# Patient Record
Sex: MALE | Race: WHITE | Employment: OTHER | URBAN - METROPOLITAN AREA
[De-identification: names, ages, dates, MRNs, and addresses within clinical notes are randomized per-mention and may not be internally consistent; named-entity substitution may affect disease eponyms.]

---

## 2020-09-11 ENCOUNTER — HOSPITAL ENCOUNTER (OUTPATIENT)
Dept: VASCULAR LAB | Age: 75
Discharge: HOME | End: 2020-09-11
Payer: COMMERCIAL

## 2020-09-11 ENCOUNTER — TELEPHONE (OUTPATIENT)
Dept: CARDIOLOGY CLINIC | Age: 75
End: 2020-09-11

## 2020-09-11 ENCOUNTER — OFFICE VISIT (OUTPATIENT)
Dept: CARDIOLOGY CLINIC | Age: 75
End: 2020-09-11
Payer: COMMERCIAL

## 2020-09-11 VITALS
BODY MASS INDEX: 29.85 KG/M2 | WEIGHT: 220.4 LBS | TEMPERATURE: 98.2 F | SYSTOLIC BLOOD PRESSURE: 126 MMHG | HEIGHT: 72 IN | HEART RATE: 105 BPM | OXYGEN SATURATION: 92 % | DIASTOLIC BLOOD PRESSURE: 72 MMHG

## 2020-09-11 PROCEDURE — 93925 LOWER EXTREMITY STUDY: CPT

## 2020-09-11 PROCEDURE — 99204 OFFICE O/P NEW MOD 45 MIN: CPT | Performed by: INTERNAL MEDICINE

## 2020-09-11 PROCEDURE — 93000 ELECTROCARDIOGRAM COMPLETE: CPT | Performed by: INTERNAL MEDICINE

## 2020-09-11 RX ORDER — VALSARTAN AND HYDROCHLOROTHIAZIDE 160; 12.5 MG/1; MG/1
1 TABLET, FILM COATED ORAL DAILY
COMMUNITY

## 2020-09-11 RX ORDER — OMEPRAZOLE 40 MG/1
40 CAPSULE, DELAYED RELEASE ORAL DAILY
COMMUNITY

## 2020-09-11 RX ORDER — ASPIRIN 81 MG/1
81 TABLET ORAL DAILY
COMMUNITY

## 2020-09-11 RX ORDER — GLIMEPIRIDE 2 MG/1
2 TABLET ORAL
COMMUNITY

## 2020-09-11 RX ORDER — THEOPHYLLINE 400 MG/1
400 TABLET, EXTENDED RELEASE ORAL DAILY
COMMUNITY

## 2020-09-11 RX ORDER — FUROSEMIDE 40 MG/1
40 TABLET ORAL 2 TIMES DAILY
COMMUNITY

## 2020-09-11 RX ORDER — ALLOPURINOL 100 MG/1
100 TABLET ORAL DAILY
COMMUNITY

## 2020-09-11 RX ORDER — PREDNISONE 10 MG/1
10 TABLET ORAL DAILY
COMMUNITY

## 2020-09-11 RX ORDER — LEVOTHYROXINE SODIUM 0.1 MG/1
100 TABLET ORAL DAILY
COMMUNITY

## 2020-09-11 RX ORDER — TIOTROPIUM BROMIDE AND OLODATEROL 3.124; 2.736 UG/1; UG/1
SPRAY, METERED RESPIRATORY (INHALATION)
COMMUNITY

## 2020-09-11 RX ORDER — ALBUTEROL SULFATE 2.5 MG/3ML
2.5 SOLUTION RESPIRATORY (INHALATION) EVERY 6 HOURS PRN
COMMUNITY

## 2020-09-11 RX ORDER — ATORVASTATIN CALCIUM 40 MG/1
40 TABLET, FILM COATED ORAL DAILY
COMMUNITY

## 2020-09-11 RX ORDER — PIOGLITAZONEHYDROCHLORIDE 30 MG/1
30 TABLET ORAL DAILY
COMMUNITY

## 2020-09-11 NOTE — PROGRESS NOTES
[address]  Cardiology Consult    Maryjane Gibson  1945    September 11, 2020      Reason for Referral: PAD    CC: \"I don't know what is going on. \"      Subjective:     History of Present Illness:    Maryjane Gibson is a 69 y.o. patient with a PMH significant for hypertension, diabetes and thoracic aortic aneurysm, COPD. Today, he is here as a new patient for peripheral artery disease. He reports that he reports that he did develop blisters on his feet. He is unsure why he had the blisters because he has not been wearing shoes for about 6 months. He is wearing continuous oxygen due to his COPD. Patient denies exertional chest pain/pressure, dyspnea at rest, GIBSON, PND, orthopnea, palpitations, lightheadedness, weight changes, changes in LE edema, and syncope. Past Medical History:   has a past medical history of COPD (chronic obstructive pulmonary disease) ([facility] [address] Diabetes mellitus ([facility] [address] Hyperlipidemia, and Hypertension. Surgical History:   has no past surgical history on file. Social History:   reports that he has quit smoking. He has never used smokeless tobacco. He reports previous alcohol use. Family History:  family history is not on file. Home Medications:  Were reviewed and are listed in nursing record and/or below  Prior to Admission medications    Medication Sig Start Date End Date Taking?  Authorizing Provider   albuterol sulfate (PROAIR RESPICLICK) 002 (90 Base) MCG/ACT aerosol powder inhalation Inhale into the lungs every 4 hours as needed for Wheezing or Shortness of Breath   Yes Historical Provider, MD   albuterol (PROVENTIL) (2.5 MG/3ML) 0.083% nebulizer solution Take 2.5 mg by nebulization every 6 hours as needed for Wheezing   Yes Historical Provider, MD   Tiotropium Bromide-Olodaterol (STIOLTO RESPIMAT) 2.5-2.5 MCG/ACT AERS Inhale into the lungs   Yes Historical Provider, MD   valsartan-hydrochlorothiazide (DIOVAN-HCT) 160-12.5 MG per tablet Take 1 tablet by mouth daily   Yes Historical Provider, MD   theophylline (UNIPHYL) 400 MG extended release tablet Take 400 mg by mouth daily   Yes Historical Provider, MD   predniSONE (DELTASONE) 10 MG tablet Take 10 mg by mouth daily   Yes Historical Provider, MD   dextrose 5 % SOLN 1,000 mL with potassium chloride 2 MEQ/ML SOLN 30 mEq Infuse intravenously continuous   Yes Historical Provider, MD   pioglitazone (ACTOS) 30 MG tablet Take 30 mg by mouth daily   Yes Historical Provider, MD   omeprazole (PRILOSEC) 40 MG delayed release capsule Take 40 mg by mouth daily   Yes Historical Provider, MD   Lidocaine 4 % LOTN Apply topically   Yes Historical Provider, MD   levothyroxine (SYNTHROID) 100 MCG tablet Take 100 mcg by mouth Daily   Yes Historical Provider, MD   Pseudoephedrine-guaiFENesin (GUAIFENESIN 600/ PO) Take by mouth   Yes Historical Provider, MD   glimepiride (AMARYL) 2 MG tablet Take 2 mg by mouth every morning (before breakfast)   Yes Historical Provider, MD   furosemide (LASIX) 40 MG tablet Take 40 mg by mouth 2 times daily   Yes Historical Provider, MD   Roflumilast (DALIRESP) 500 MCG tablet Take 500 mcg by mouth daily   Yes Historical Provider, MD   atorvastatin (LIPITOR) 40 MG tablet Take 40 mg by mouth daily   Yes Historical Provider, MD   aspirin 81 MG EC tablet Take 81 mg by mouth daily   Yes Historical Provider, MD   allopurinol (ZYLOPRIM) 100 MG tablet Take 100 mg by mouth daily   Yes Historical Provider, MD        CURRENT Medications:  No current facility-administered medications for this visit. Allergies:  Patient has no known allergies. Review of Systems: SEE HPI   · Constitutional: no unanticipated weight loss. There's been no change in energy level, sleep pattern, or activity level. No fevers, chills. · Eyes: No visual changes or diplopia. No scleral icterus. · ENT: No Headaches, hearing loss or vertigo. No mouth sores or sore throat.   · Cardiovascular: No Chest pain, tightness or discomfort.  No Shortness of breath. No Dyspnea on exertion, Orthopnea, Paroxysmal nocturnal dyspnea or breathlessness at rest.   No Palpitations.  No Syncope ('blackouts', 'faints', 'collapse') or dizziness. · Respiratory: No cough or wheezing, no sputum production. No hematemesis. · Gastrointestinal: No abdominal pain, appetite loss, blood in stools. No change in bowel or bladder habits. · Genitourinary: No dysuria, trouble voiding, or hematuria. · Musculoskeletal:  No gait disturbance, no joint complaints. · Integumentary: No rash or pruritis. · Neurological: No headache, diplopia, change in muscle strength, numbness or tingling. · Psychiatric: No anxiety or depression. · Endocrine: No temperature intolerance. No excessive thirst, fluid intake, or urination. No tremor. · Hematologic/Lymphatic: No abnormal bruising or bleeding, blood clots or swollen lymph nodes. · Allergic/Immunologic: No nasal congestion or hives. Objective:     PHYSICAL EXAM:      Vitals:    09/11/20 1340   BP: 126/72   Pulse: 105   Temp: 98.2 °F (36.8 °C)   SpO2: 92%    Weight: 220 lb 6.4 oz (100 kg)       General Appearance:  Alert, cooperative, no distress, appears stated age. Head:  Normocephalic, without obvious abnormality, atraumatic. Eyes:  Pupils equal and round. No scleral icterus. Mouth: Moist mucosa, no pharyngeal erythema. Nose: Nares normal. No drainage or sinus tenderness. Neck: Supple, symmetrical, trachea midline. No adenopathy. No tenderness/mass/nodules. No carotid bruit or elevated JVD. Lungs:   Respiratory Effort: Normal   Auscultation: Clear to auscultation bilaterally, respirations unlabored. No wheeze, rales   Chest Wall:  No tenderness or deformity. Cardiovascular:    Pulses  Palpation: normal   Ascultation: Regular rate, S1/ S2 normal. No murmur, rub, or gallop.   2+ radial and pedal pulses, symmetric  Carotid  Femoral   Abdomen and Gastrointestinal:   Soft, non-tender, bowel sounds active. Liver and Spleen  Masses   Musculoskeletal: No muscle wasting  Back  Gait   Extremities: Extremities normal, atraumatic. No cyanosis or edema. No cyanosis clubbing       Skin: Inspection and palpation performed, no rashes or lesions. Pysch: Normal mood and affect. Alert and oriented to time place person   Neurologic: Normal gross motor and sensory exam.       Labs     All labs have been reviewed    No results found for: WBC, RBC, HGB, HCT, MCV, RDW, PLT  No results found for: NA, K, CL, CO2, BUN, CREATININE, GFRAA, AGRATIO, LABGLOM, GLUCOSE, PROT, CALCIUM, BILITOT, ALKPHOS, AST, ALT  No results found for: PTINR  No results found for: LABA1C  No results found for: CKTOTAL, CKMB, CKMBINDEX, TROPONINI    Cardiac, Vascular and Imaging Data all Personally Reviewed in Detail by Myself      EK2020 Sinus tachycardia    Echocardiogram: 2020  Overall left ventricular ejection fraction is estimated to be 50-55%. Septal motion is consistent with conduction abnormality. Stress Test:     Cath: Other imaging:     Assessment and Plan     Peripheral artery disease/Critical limb ischemia  Discoloration of feet. L> R. Will get a bilateral lower extremity arterial doppler to assess further. Continue Asa and statin. Strongly suspect small vessel disease and tibial disease  Will need diagnostic angiogram.    Thoracic aortic aneurysm  Following with Dr Lauren Sow. Diabetes  Managed by PCP. Follow up after testing. Thank you for allowing us to participate in the care of Luci Primer. Please do not hesitate to contact me if you have any questions.     Tracie Oh MD, MPH    [facility], [address] [place], [place] [address]ard [address] [address]  Ph: [phone]  Fax: [phone]      This note was scribed in the presence of Dr Vera Mckeon, by Irma Dennis RN  Physician Attestation:  The scribes documentation has been prepared under my direction and personally reviewed by me in its entirety. I confirm that the note above accurately reflects all work, treatment, procedures, and medical decision making performed by me.

## 2020-09-11 NOTE — TELEPHONE ENCOUNTER
Peripheral angiogram scheduled for 9/16 at 1:00  Arrive at 11:30        The morning of your procedure you will park in the hospital parking lot and report directly to the cath lab to check in.     Pre-Procedure Instructions   1. You will need to fast for at least 8 hours prior to procedure. No caffeine the morning of.    2. Hold your diuretic, Valsartan/HCTZ and Lasix the morning of procedure. 3. Hold all diabetic medications including, Metfomin. If you take Lantus/Levemir only take ½ your normal dose the evening before. All other medications can be taken in the morning with sips of water. 4. You will need to take 325 mg aspirin the morning of. If you are currently taking 81 mg please take 4 tablets that morning. 5. Do not use any lotions, creams or perfume the morning of procedure. 6. Pre-procedure lab work will need to be completed 5-7 days prior to procedure. 7. Please have a responsible adult to drive you home after procedure. We advise you have someone to stay with you for 24 hours following procedure for precautionary measures. Depending on procedure you may require an overnight stay. 8. Cath lab will provide you with all post procedure instructions. If you have any questions regarding the procedure itself or medications, please call [phone] and ask to speak with a nurse. Called patient and let him know of the date/time. He asked that I email him the instructions. He called and confirmed that he received the email. All questions answered. He is unable to make it back to the lab and will need labs drawn on the morning of procedure. Will email Azra Davison in cath lab.

## 2020-09-11 NOTE — PATIENT INSTRUCTIONS
thinners can cause serious bleeding problems. · Ask your doctor if a cardiac rehab program is right for you. Cardiac rehab can help you make lifestyle changes. In cardiac rehab, a team of health professionals provides education and support to help you make new, healthy habits. · Eat heart-healthy foods such as fruits, vegetables, whole grains, fish, lean meats, and low-fat or nonfat dairy foods. Limit sodium, sugar, and alcohol. · If your doctor recommends it, get more exercise. Walking is a good choice. Bit by bit, increase the amount you walk every day. Try for at least 30 minutes on most days of the week. If you have symptoms when you exercise, ask your doctor about a special exercise program that may help relieve your symptoms. · Stay at a healthy weight. Lose weight if you need to. · Take good care of your feet. ? Treat cuts and scrapes on your legs right away. Poor blood flow prevents (or slows) quick healing of even small cuts or scrapes. This is even more important if you have diabetes. ? Avoid shoes that are too tight or that rub your feet. Shoes should be comfortable and fit well. ? Avoid socks or stockings that are tight enough to leave elastic-band marks on your legs. Tight socks can make circulation problems worse. ? Keep your feet clean and moisturized to prevent drying and cracking. Place cotton or lamb's wool between your toes to prevent rubbing and to absorb moisture. ? If you have a sore on your leg or foot, keep it dry and cover it with a nonstick bandage until you see your doctor. When should you call for help? [id number] anytime you think you may need emergency care. For example, call if:  · You have symptoms of a heart attack. These may include:  ? Chest pain or pressure, or a strange feeling in the chest.  ? Sweating. ? Shortness of breath. ? Nausea or vomiting.   ? Pain, pressure, or a strange feeling in the back, neck, jaw, or upper belly or in one or both shoulders or questions about a medical condition or this instruction, always ask your healthcare professional. [place] [address] any warranty or liability for your use of this information.

## 2020-09-15 RX ORDER — SODIUM CHLORIDE 0.9 % (FLUSH) 0.9 %
10 SYRINGE (ML) INJECTION EVERY 12 HOURS SCHEDULED
Status: CANCELLED | OUTPATIENT
Start: 2020-09-16

## 2020-09-15 RX ORDER — SODIUM CHLORIDE 9 MG/ML
INJECTION, SOLUTION INTRAVENOUS CONTINUOUS
Status: CANCELLED | OUTPATIENT
Start: 2020-09-16

## 2020-09-15 RX ORDER — SODIUM CHLORIDE 0.9 % (FLUSH) 0.9 %
10 SYRINGE (ML) INJECTION PRN
Status: CANCELLED | OUTPATIENT
Start: 2020-09-16

## 2020-09-16 ENCOUNTER — HOSPITAL ENCOUNTER (OUTPATIENT)
Dept: CARDIAC CATH/INVASIVE PROCEDURES | Age: 75
Discharge: HOME | End: 2020-09-16
Attending: INTERNAL MEDICINE | Admitting: INTERNAL MEDICINE
Payer: COMMERCIAL

## 2020-09-16 VITALS
TEMPERATURE: 97.6 F | HEIGHT: 72 IN | SYSTOLIC BLOOD PRESSURE: 114 MMHG | BODY MASS INDEX: 29.8 KG/M2 | OXYGEN SATURATION: 94 % | WEIGHT: 220 LBS | HEART RATE: 74 BPM | DIASTOLIC BLOOD PRESSURE: 78 MMHG

## 2020-09-16 PROBLEM — I73.9 PAD (PERIPHERAL ARTERY DISEASE) (HCC): Status: ACTIVE | Noted: 2020-09-16

## 2020-09-16 LAB
ANION GAP SERPL CALCULATED.3IONS-SCNC: 10 MMOL/L (ref 3–16)
BUN BLDV-MCNC: 34 MG/DL (ref 7–20)
CALCIUM IONIZED: 1.31 MMOL/L (ref 1.12–1.32)
CALCIUM SERPL-MCNC: 10 MG/DL (ref 8.3–10.6)
CHLORIDE BLD-SCNC: 100 MMOL/L (ref 99–110)
CO2: 32 MMOL/L (ref 21–32)
CREAT SERPL-MCNC: 1.5 MG/DL (ref 0.8–1.3)
GFR AFRICAN AMERICAN: 56
GFR AFRICAN AMERICAN: >60
GFR NON-AFRICAN AMERICAN: 46
GFR NON-AFRICAN AMERICAN: 50
GLUCOSE BLD-MCNC: 132 MG/DL (ref 70–99)
GLUCOSE BLD-MCNC: 136 MG/DL (ref 70–99)
HCT VFR BLD CALC: 41.1 % (ref 40.5–52.5)
HEMOGLOBIN: 12.8 G/DL (ref 13.5–17.5)
LV EF: 43 %
LVEF MODALITY: NORMAL
MCH RBC QN AUTO: 24.9 PG (ref 26–34)
MCHC RBC AUTO-ENTMCNC: 31.1 G/DL (ref 31–36)
MCV RBC AUTO: 79.9 FL (ref 80–100)
PDW BLD-RTO: 16.5 % (ref 12.4–15.4)
PERFORMED ON: ABNORMAL
PLATELET # BLD: 543 K/UL (ref 135–450)
PMV BLD AUTO: 8.6 FL (ref 5–10.5)
POC ACT LR: 193 SEC
POC CHLORIDE: 102 MMOL/L (ref 99–110)
POC CREATININE: 1.4 MG/DL (ref 0.8–1.3)
POC POTASSIUM: 4 MMOL/L (ref 3.5–5.1)
POC SAMPLE TYPE: ABNORMAL
POC SODIUM: 143 MMOL/L (ref 136–145)
POTASSIUM SERPL-SCNC: 4.1 MMOL/L (ref 3.5–5.1)
RBC # BLD: 5.14 M/UL (ref 4.2–5.9)
SODIUM BLD-SCNC: 142 MMOL/L (ref 136–145)
WBC # BLD: 12.2 K/UL (ref 4–11)

## 2020-09-16 PROCEDURE — 6360000004 HC RX CONTRAST MEDICATION: Performed by: INTERNAL MEDICINE

## 2020-09-16 PROCEDURE — 82565 ASSAY OF CREATININE: CPT

## 2020-09-16 PROCEDURE — 82435 ASSAY OF BLOOD CHLORIDE: CPT

## 2020-09-16 PROCEDURE — C1894 INTRO/SHEATH, NON-LASER: HCPCS

## 2020-09-16 PROCEDURE — 80048 BASIC METABOLIC PNL TOTAL CA: CPT

## 2020-09-16 PROCEDURE — 36247 INS CATH ABD/L-EXT ART 3RD: CPT

## 2020-09-16 PROCEDURE — 84132 ASSAY OF SERUM POTASSIUM: CPT

## 2020-09-16 PROCEDURE — 75716 ARTERY X-RAYS ARMS/LEGS: CPT | Performed by: INTERNAL MEDICINE

## 2020-09-16 PROCEDURE — 82947 ASSAY GLUCOSE BLOOD QUANT: CPT

## 2020-09-16 PROCEDURE — 85347 COAGULATION TIME ACTIVATED: CPT

## 2020-09-16 PROCEDURE — 99152 MOD SED SAME PHYS/QHP 5/>YRS: CPT

## 2020-09-16 PROCEDURE — 75625 CONTRAST EXAM ABDOMINL AORTA: CPT

## 2020-09-16 PROCEDURE — 2709999900 HC NON-CHARGEABLE SUPPLY

## 2020-09-16 PROCEDURE — 6360000002 HC RX W HCPCS

## 2020-09-16 PROCEDURE — 75716 ARTERY X-RAYS ARMS/LEGS: CPT

## 2020-09-16 PROCEDURE — 84295 ASSAY OF SERUM SODIUM: CPT

## 2020-09-16 PROCEDURE — 99153 MOD SED SAME PHYS/QHP EA: CPT

## 2020-09-16 PROCEDURE — 85027 COMPLETE CBC AUTOMATED: CPT

## 2020-09-16 PROCEDURE — 36247 INS CATH ABD/L-EXT ART 3RD: CPT | Performed by: INTERNAL MEDICINE

## 2020-09-16 PROCEDURE — 2500000003 HC RX 250 WO HCPCS

## 2020-09-16 PROCEDURE — 75625 CONTRAST EXAM ABDOMINL AORTA: CPT | Performed by: INTERNAL MEDICINE

## 2020-09-16 PROCEDURE — C1769 GUIDE WIRE: HCPCS

## 2020-09-16 PROCEDURE — 82330 ASSAY OF CALCIUM: CPT

## 2020-09-16 PROCEDURE — 75774 ARTERY X-RAY EACH VESSEL: CPT

## 2020-09-16 PROCEDURE — 93306 TTE W/DOPPLER COMPLETE: CPT

## 2020-09-16 RX ORDER — ACETAMINOPHEN 325 MG/1
650 TABLET ORAL EVERY 4 HOURS PRN
Status: DISCONTINUED | OUTPATIENT
Start: 2020-09-16 | End: 2020-09-17 | Stop reason: HOSPADM

## 2020-09-16 RX ORDER — SODIUM CHLORIDE 0.9 % (FLUSH) 0.9 %
10 SYRINGE (ML) INJECTION PRN
Status: DISCONTINUED | OUTPATIENT
Start: 2020-09-16 | End: 2020-09-16 | Stop reason: SDUPTHER

## 2020-09-16 RX ORDER — ASPIRIN 325 MG
325 TABLET ORAL ONCE
Status: DISCONTINUED | OUTPATIENT
Start: 2020-09-16 | End: 2020-09-17 | Stop reason: HOSPADM

## 2020-09-16 RX ORDER — SODIUM CHLORIDE 0.9 % (FLUSH) 0.9 %
10 SYRINGE (ML) INJECTION EVERY 12 HOURS SCHEDULED
Status: DISCONTINUED | OUTPATIENT
Start: 2020-09-16 | End: 2020-09-17 | Stop reason: HOSPADM

## 2020-09-16 RX ORDER — IODIXANOL 320 MG/ML
110 INJECTION, SOLUTION INTRAVASCULAR
Status: COMPLETED | OUTPATIENT
Start: 2020-09-16 | End: 2020-09-16

## 2020-09-16 RX ORDER — SODIUM CHLORIDE 0.9 % (FLUSH) 0.9 %
10 SYRINGE (ML) INJECTION PRN
Status: DISCONTINUED | OUTPATIENT
Start: 2020-09-16 | End: 2020-09-17 | Stop reason: HOSPADM

## 2020-09-16 RX ORDER — ONDANSETRON 2 MG/ML
4 INJECTION INTRAMUSCULAR; INTRAVENOUS EVERY 6 HOURS PRN
Status: DISCONTINUED | OUTPATIENT
Start: 2020-09-16 | End: 2020-09-17 | Stop reason: HOSPADM

## 2020-09-16 RX ORDER — SODIUM CHLORIDE 0.9 % (FLUSH) 0.9 %
10 SYRINGE (ML) INJECTION EVERY 12 HOURS SCHEDULED
Status: DISCONTINUED | OUTPATIENT
Start: 2020-09-16 | End: 2020-09-16 | Stop reason: SDUPTHER

## 2020-09-16 RX ORDER — SODIUM CHLORIDE 9 MG/ML
INJECTION, SOLUTION INTRAVENOUS CONTINUOUS
Status: DISCONTINUED | OUTPATIENT
Start: 2020-09-16 | End: 2020-09-17 | Stop reason: HOSPADM

## 2020-09-16 RX ADMIN — IODIXANOL 110 ML: 320 INJECTION, SOLUTION INTRAVASCULAR at 13:52

## 2020-09-16 NOTE — H&P
Reason for Referral: PAD     CC: \"I don't know what is going on. \"        Subjective:      History of Present Illness:     Ariela Doe is a 69 y.o. patient with a PMH significant for hypertension, diabetes and thoracic aortic aneurysm, COPD. Today, he is here as a new patient for peripheral artery disease. He reports that he reports that he did develop blisters on his feet. He is unsure why he had the blisters because he has not been wearing shoes for about 6 months. He is wearing continuous oxygen due to his COPD. Patient denies exertional chest pain/pressure, dyspnea at rest, KILLIAN, PND, orthopnea, palpitations, lightheadedness, weight changes, changes in LE edema, and syncope.        Past Medical History:   has a past medical history of COPD (chronic obstructive pulmonary disease) ([facility] [address] Diabetes mellitus ([facility] [address] Hyperlipidemia, and Hypertension.     Surgical History:   has no past surgical history on file.      Social History:   reports that he has quit smoking. He has never used smokeless tobacco. He reports previous alcohol use.      Family History:  family history is not on file.     Home Medications:  Were reviewed and are listed in nursing record and/or below  Home Medications           Prior to Admission medications    Medication Sig Start Date End Date Taking?  Authorizing Provider   albuterol sulfate (PROAIR RESPICLICK) 518 (90 Base) MCG/ACT aerosol powder inhalation Inhale into the lungs every 4 hours as needed for Wheezing or Shortness of Breath     Yes Historical Provider, MD   albuterol (PROVENTIL) (2.5 MG/3ML) 0.083% nebulizer solution Take 2.5 mg by nebulization every 6 hours as needed for Wheezing     Yes Historical Provider, MD   Tiotropium Bromide-Olodaterol (STIOLTO RESPIMAT) 2.5-2.5 MCG/ACT AERS Inhale into the lungs     Yes Historical Provider, MD   valsartan-hydrochlorothiazide (DIOVAN-HCT) 160-12.5 MG per tablet Take 1 tablet by mouth daily     Yes Historical Provider, MD theophylline (UNIPHYL) 400 MG extended release tablet Take 400 mg by mouth daily     Yes Historical Provider, MD   predniSONE (DELTASONE) 10 MG tablet Take 10 mg by mouth daily     Yes Historical Provider, MD   dextrose 5 % SOLN 1,000 mL with potassium chloride 2 MEQ/ML SOLN 30 mEq Infuse intravenously continuous     Yes Historical Provider, MD   pioglitazone (ACTOS) 30 MG tablet Take 30 mg by mouth daily     Yes Historical Provider, MD   omeprazole (PRILOSEC) 40 MG delayed release capsule Take 40 mg by mouth daily     Yes Historical Provider, MD   Lidocaine 4 % LOTN Apply topically     Yes Historical Provider, MD   levothyroxine (SYNTHROID) 100 MCG tablet Take 100 mcg by mouth Daily     Yes Historical Provider, MD   Pseudoephedrine-guaiFENesin (GUAIFENESIN 600/ PO) Take by mouth     Yes Historical Provider, MD   glimepiride (AMARYL) 2 MG tablet Take 2 mg by mouth every morning (before breakfast)     Yes Historical Provider, MD   furosemide (LASIX) 40 MG tablet Take 40 mg by mouth 2 times daily     Yes Historical Provider, MD   Roflumilast (DALIRESP) 500 MCG tablet Take 500 mcg by mouth daily     Yes Historical Provider, MD   atorvastatin (LIPITOR) 40 MG tablet Take 40 mg by mouth daily     Yes Historical Provider, MD   aspirin 81 MG EC tablet Take 81 mg by mouth daily     Yes Historical Provider, MD   allopurinol (ZYLOPRIM) 100 MG tablet Take 100 mg by mouth daily     Yes Historical Provider, MD            CURRENT Medications:    Current Meds Link used for Sign Out Report   No current facility-administered medications for this visit.         Allergies:  Patient has no known allergies.             Review of Systems: SEE HPI   · Constitutional: no unanticipated weight loss. There's been no change in energy level, sleep pattern, or activity level. No fevers, chills. · Eyes: No visual changes or diplopia. No scleral icterus. · ENT: No Headaches, hearing loss or vertigo.  No mouth sores or sore throat. · Cardiovascular: No Chest pain, tightness or discomfort. · No Shortness of breath. No Dyspnea on exertion, Orthopnea, Paroxysmal nocturnal dyspnea or breathlessness at rest.  · No Palpitations. · No Syncope ('blackouts', 'faints', 'collapse') or dizziness. · Respiratory: No cough or wheezing, no sputum production. No hematemesis. · Gastrointestinal: No abdominal pain, appetite loss, blood in stools. No change in bowel or bladder habits. · Genitourinary: No dysuria, trouble voiding, or hematuria. · Musculoskeletal:  No gait disturbance, no joint complaints. · Integumentary: No rash or pruritis. · Neurological: No headache, diplopia, change in muscle strength, numbness or tingling. · Psychiatric: No anxiety or depression. · Endocrine: No temperature intolerance. No excessive thirst, fluid intake, or urination. No tremor. · Hematologic/Lymphatic: No abnormal bruising or bleeding, blood clots or swollen lymph nodes. · Allergic/Immunologic: No nasal congestion or hives.        Objective:      PHYSICAL EXAM:           Vitals:     09/11/20 1340   BP: 126/72   Pulse: 105   Temp: 98.2 °F (36.8 °C)   SpO2: 92%    Weight: 220 lb 6.4 oz (100 kg)       General Appearance:  Alert, cooperative, no distress, appears stated age. Head:  Normocephalic, without obvious abnormality, atraumatic. Eyes:  Pupils equal and round. No scleral icterus. Mouth: Moist mucosa, no pharyngeal erythema. Nose: Nares normal. No drainage or sinus tenderness. Neck: Supple, symmetrical, trachea midline. No adenopathy. No tenderness/mass/nodules. No carotid bruit or elevated JVD. Lungs:   Respiratory Effort: Normal   Auscultation: Clear to auscultation bilaterally, respirations unlabored. No wheeze, rales   Chest Wall:  No tenderness or deformity. Cardiovascular:     Pulses  Palpation: normal   Ascultation: Regular rate, S1/ S2 normal. No murmur, rub, or gallop.   2+ radial and pedal pulses, symmetric  Carotid  Femoral   Abdomen and Gastrointestinal:   Soft, non-tender, bowel sounds active. Liver and Spleen  Masses   Musculoskeletal: No muscle wasting  Back  Gait   Extremities: Extremities normal, atraumatic. No cyanosis or edema. No cyanosis clubbing         Skin: Inspection and palpation performed, no rashes or lesions. Pysch: Normal mood and affect. Alert and oriented to time place person   Neurologic: Normal gross motor and sensory exam.       Labs      All labs have been reviewed     No results found for: WBC, RBC, HGB, HCT, MCV, RDW, PLT  No results found for: NA, K, CL, CO2, BUN, CREATININE, GFRAA, AGRATIO, LABGLOM, GLUCOSE, PROT, CALCIUM, BILITOT, ALKPHOS, AST, ALT  No results found for: PTINR  No results found for: LABA1C  No results found for: CKTOTAL, CKMB, CKMBINDEX, TROPONINI     Cardiac, Vascular and Imaging Data all Personally Reviewed in Detail by Myself       EK2020 Sinus tachycardia     Echocardiogram: 2020  Overall left ventricular ejection fraction is estimated to be 50-55%. Septal motion is consistent with conduction abnormality.     Stress Test:      Cath:     Other imaging:      Assessment and Plan      Peripheral artery disease/Critical limb ischemia  Discoloration of feet. L> R. Will get a bilateral lower extremity arterial doppler to assess further. Continue Asa and statin. Strongly suspect small vessel disease and tibial disease  Will need diagnostic angiogram.     Thoracic aortic aneurysm  Following with Dr Bruce Siegel.     Diabetes  Managed by PCP.      Follow up after testing.     Thank you for allowing us to participate in the care of Ariela Doe.   Please do not hesitate to contact me if you have any questions.     Tye Goncalves MD, MPH

## 2020-09-16 NOTE — PROCEDURES
[address] [address]                            CARDIAC CATHETERIZATION    PATIENT NAME: Sabas Baez                  :        1945  MED REC NO:   [id number]                          ROOM:  ACCOUNT NO:   [de-identified]                           ADMIT DATE: 2020  PROVIDER:     Harmony Snow MD    DATE OF PROCEDURE:  2020    PROCEDURE PERFORMED:  Abdominal aortogram with bilateral selective lower  extremity angiography. INDICATION FOR PROCEDURE:  Peripheral arterial disease, limb ischemia. Informed consent obtained. ASA is II. Mallampati score II. PROCEDURE IN DETAIL:  The patient was brought to the cardiac cath  laboratory. Right groin was prepped and draped in sterile fashion. We  accessed the right common femoral artery. We inserted a 5-British  sheath. Then, we inserted a pigtail catheter and performed abdominal  aortogram.  Then, pigtail was removed. JACI was advanced and used to  select and engage the left common iliac artery. We performed a left  lower extremity selective angiography. Then, we advanced a Glidewire  Advantage advancing the JACI down to the mid left superficial femoral  artery. We performed left lower extremity selective angiography. Then,  the Glidewire was inserted. JACI sheath was removed. A 6-British  crossover sheath was advanced and parked in the left common femoral  artery. Then, we advanced a Mariner catheter down the left anterior  tibial artery and performed left lower extremity selective angiography  with the catheter in the left anterior tibial artery. Then, the sheath  was positioned in the right external iliac artery. Catheter was  removed. Right lower extremity selective angiography was performed. Then, the sheath was removed. Manual pressure was applied to achieve  hemostasis. No complication.   Estimated blood loss less than 30 mL.    ANGIOGRAPHY FINDINGS:  1. Abdominal aorta has mild aneurysmal dilation. It has patent renal  and mesenterics. 2.  Left and right common iliac arteries, internal iliac arteries are  patent. 3.  Bilateral external iliac arteries are patent. 4.  Left common femoral, profunda femoris patent. 5.  Left superficial femoral artery is patent. Distally, there is a  left superficial femoral artery aneurysm present. 6.  Left popliteal artery is patent with patent tibioperoneal trunk. The patient has very slow flow in the tibial vessel with distal  occlusion of the dorsalis pedis. 7.  Right common femoral, profunda femoris, and superficial femoral  arteries are patent. Difficult to visualize the right below-the-knee  vessels as tibial flow is very slow. SUMMARY:  1. 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 2.  Possible embolization from left superficial femoral artery. 3.  Slow flow, rule out any cardiac causes. RECOMMENDATION:  1. Continue postop post cath care. 2.  Site care. 3.  Risk factor modification. 4.  The patient will need an echocardiogram to assess his cardiac  function. 5.  Will need repair of left superficial femoral artery aneurysm.         Michael Jorgensen MD    D: 09/16/2020 14:02:19       T: 09/16/2020 16:01:10     [id number]  Job#: [id number]     Doc#: [id number]    CC:

## 2020-09-18 ENCOUNTER — TELEPHONE (OUTPATIENT)
Dept: CARDIOLOGY CLINIC | Age: 75
End: 2020-09-18

## 2020-09-21 ENCOUNTER — OFFICE VISIT (OUTPATIENT)
Dept: CARDIOTHORACIC SURGERY | Age: 75
End: 2020-09-21
Payer: COMMERCIAL

## 2020-09-21 VITALS — BODY MASS INDEX: 30.65 KG/M2 | WEIGHT: 226 LBS | TEMPERATURE: 97.4 F

## 2020-09-21 PROCEDURE — 99204 OFFICE O/P NEW MOD 45 MIN: CPT | Performed by: THORACIC SURGERY (CARDIOTHORACIC VASCULAR SURGERY)

## 2020-09-21 RX ORDER — POTASSIUM CHLORIDE 750 MG/1
10 TABLET, FILM COATED, EXTENDED RELEASE ORAL 2 TIMES DAILY
COMMUNITY

## 2020-09-24 ENCOUNTER — TELEPHONE (OUTPATIENT)
Dept: CARDIOTHORACIC SURGERY | Age: 75
End: 2020-09-24

## 2020-09-24 NOTE — TELEPHONE ENCOUNTER
Received call from patient this morning regarding his referral to nephrology. He states that his \"leg doctor\" is going to make the referral since he never heard from our office. I stated that I already sent the referral to nephrology on Tuesday this week and that I spoke to him that day and gave him their office phone number in case they didn't reach out in a timely manner. He does not remember this conversation. I asked that he please call me with appt details so that I can fax records and reason for referral. He was agreeable to this. Did not hear back from patient so I called him just now. He now states that he is going to see his heart doctor at [facility] next week for a second opinion on heart surgery and that he does not want to proceed with anything further until then. He states that he will call me after his second opinion to let me know what he has decided. We will wait to hear.

## 2020-09-30 ENCOUNTER — TELEPHONE (OUTPATIENT)
Dept: CARDIOTHORACIC SURGERY | Age: 75
End: 2020-09-30

## 2020-09-30 ENCOUNTER — TELEPHONE (OUTPATIENT)
Dept: CARDIOLOGY CLINIC | Age: 75
End: 2020-09-30

## 2020-09-30 NOTE — TELEPHONE ENCOUNTER
Last note from 9/18/2020 encounter states An Mendosa spoke to him and Jesu Siu he was putting everything on hold because he wanted a second opinion.

## 2020-09-30 NOTE — TELEPHONE ENCOUNTER
See encounter from 9/18/2020    Learta Medici from [facility] is calling stating Lilly Salinas has canceled all of his testing, labs and Dr. Appointment with Dr. Kelvin Cullen for this coming Monday 10/5/2020.     If we have any questions we can call Sara at [facility] at [phone] option #3

## 2020-09-30 NOTE — TELEPHONE ENCOUNTER
Received call from Dr. Elio Hernandez office. He is the nephrologist that we referred patient to. They were just calling to let me know that patient cancelled is OV on 10/5/20 and did not wish to reschedule. They are also going to call Dr. Matthew Murray office to let them know as well.

## 2020-09-30 NOTE — TELEPHONE ENCOUNTER
Noted. Dr Mark Billingsley spoke with patient yesterday. Will update encounter once I get details from Dr Mark Billingsley.

## 2020-10-08 ENCOUNTER — TELEPHONE (OUTPATIENT)
Dept: CARDIOLOGY CLINIC | Age: 75
End: 2020-10-08

## 2020-10-08 NOTE — TELEPHONE ENCOUNTER
Please call patient to schedule f/u with Dr. Polina Ortiz. All questions can be addressed at that time.

## 2020-10-08 NOTE — TELEPHONE ENCOUNTER
Riya Membreno called in this morning and would like to talk to Dr. Fantasma Farias about his condition. He was last seen back on 9/11/20 and would like some more information on what exactly is going on with the bloodlfow to his leg and where the infection is?      Please contact Riya Membreno back at [phone]

## 2020-10-08 NOTE — TELEPHONE ENCOUNTER
Pt was to follow up with Dr. Dominique Lawson (per OV note) after angiogram which he had done on 9/147/2020. Should patient make an appointment to come in and discuss what is going on with his legs?

## 2020-10-14 NOTE — PROGRESS NOTES
[address]  Cardiology Consult    Medhat Bryant  1945    October 15, 2020      Reason for Referral: PAD    CC: \"I am ready to have my left leg done. \"      Subjective:     History of Present Illness:    Medhat Bryant is a 69 y.o. patient with a PMH significant for hypertension, diabetes and thoracic aortic aneurysm, COPD. Today, he is here to discuss his peripheral artery disease. He is ready to have his leg fixed. He has been following with lung doctor and he was told her cannot be intubated for any procedure. He continuous oxygen therapy. Patient denies exertional chest pain/pressure, dyspnea at rest, KILLIAN, PND, orthopnea, palpitations, lightheadedness, weight changes, changes in LE edema, and syncope. Patient reports compliance to his medications. Past Medical History:   has a past medical history of COPD (chronic obstructive pulmonary disease) ([facility] [address] Diabetes mellitus ([facility] [address] Hyperlipidemia, and Hypertension. Surgical History:   has no past surgical history on file. Social History:   reports that he has quit smoking. He has never used smokeless tobacco. He reports previous alcohol use. Family History:  family history is not on file. Home Medications:  Were reviewed and are listed in nursing record and/or below  Prior to Admission medications    Medication Sig Start Date End Date Taking?  Authorizing Provider   potassium chloride (KLOR-CON) 10 MEQ extended release tablet Take 10 mEq by mouth 2 times daily   Yes Historical Provider, MD   albuterol sulfate (PROAIR RESPICLICK) 041 (90 Base) MCG/ACT aerosol powder inhalation Inhale into the lungs every 4 hours as needed for Wheezing or Shortness of Breath   Yes Historical Provider, MD   albuterol (PROVENTIL) (2.5 MG/3ML) 0.083% nebulizer solution Take 2.5 mg by nebulization every 6 hours as needed for Wheezing   Yes Historical Provider, MD   Tiotropium Bromide-Olodaterol (STIOLTO RESPIMAT) 2.5-2.5 MCG/ACT AERS Inhale into the lungs   Yes Historical Provider, MD   valsartan-hydrochlorothiazide (DIOVAN-HCT) 160-12.5 MG per tablet Take 1 tablet by mouth daily   Yes Historical Provider, MD   predniSONE (DELTASONE) 10 MG tablet Take 10 mg by mouth daily   Yes Historical Provider, MD   pioglitazone (ACTOS) 30 MG tablet Take 30 mg by mouth daily   Yes Historical Provider, MD   omeprazole (PRILOSEC) 40 MG delayed release capsule Take 40 mg by mouth daily   Yes Historical Provider, MD   Lidocaine 4 % LOTN Apply topically   Yes Historical Provider, MD   levothyroxine (SYNTHROID) 100 MCG tablet Take 100 mcg by mouth Daily   Yes Historical Provider, MD   Pseudoephedrine-guaiFENesin (GUAIFENESIN 600/ PO) Take by mouth   Yes Historical Provider, MD   glimepiride (AMARYL) 2 MG tablet Take 2 mg by mouth every morning (before breakfast)   Yes Historical Provider, MD   furosemide (LASIX) 40 MG tablet Take 40 mg by mouth 2 times daily   Yes Historical Provider, MD   Roflumilast (DALIRESP) 500 MCG tablet Take 500 mcg by mouth daily   Yes Historical Provider, MD   atorvastatin (LIPITOR) 40 MG tablet Take 40 mg by mouth daily   Yes Historical Provider, MD   aspirin 81 MG EC tablet Take 81 mg by mouth daily   Yes Historical Provider, MD   allopurinol (ZYLOPRIM) 100 MG tablet Take 100 mg by mouth daily   Yes Historical Provider, MD   theophylline (UNIPHYL) 400 MG extended release tablet Take 400 mg by mouth daily    Historical Provider, MD        CURRENT Medications:  No current facility-administered medications for this visit. Allergies:  Patient has no known allergies. Review of Systems: SEE HPI   · Constitutional: no unanticipated weight loss. There's been no change in energy level, sleep pattern, or activity level. No fevers, chills. · Eyes: No visual changes or diplopia. No scleral icterus. · ENT: No Headaches, hearing loss or vertigo. No mouth sores or sore throat. · Cardiovascular: No Chest pain, tightness or discomfort.  No Shortness of breath. No Dyspnea on exertion, Orthopnea, Paroxysmal nocturnal dyspnea or breathlessness at rest.   No Palpitations.  No Syncope ('blackouts', 'faints', 'collapse') or dizziness. · Respiratory: No cough or wheezing, no sputum production. No hematemesis. · Gastrointestinal: No abdominal pain, appetite loss, blood in stools. No change in bowel or bladder habits. · Genitourinary: No dysuria, trouble voiding, or hematuria. · Musculoskeletal:  No gait disturbance, no joint complaints. · Integumentary: No rash or pruritis. · Neurological: No headache, diplopia, change in muscle strength, numbness or tingling. · Psychiatric: No anxiety or depression. · Endocrine: No temperature intolerance. No excessive thirst, fluid intake, or urination. No tremor. · Hematologic/Lymphatic: No abnormal bruising or bleeding, blood clots or swollen lymph nodes. · Allergic/Immunologic: No nasal congestion or hives. Objective:     PHYSICAL EXAM:      Vitals:    10/15/20 1354   BP: 134/64   Pulse: 108   Temp: 97.5 °F (36.4 °C)   SpO2: 93%    Weight: 212 lb (96.2 kg)       General Appearance:  Alert, cooperative, no distress, appears stated age. Head:  Normocephalic, without obvious abnormality, atraumatic. Eyes:  Pupils equal and round. No scleral icterus. Mouth: Moist mucosa, no pharyngeal erythema. Nose: Nares normal. No drainage or sinus tenderness. Neck: Supple, symmetrical, trachea midline. No adenopathy. No tenderness/mass/nodules. No carotid bruit or elevated JVD. Lungs:   Respiratory Effort: Normal   Auscultation: Clear to auscultation bilaterally, respirations unlabored. No wheeze, rales   Chest Wall:  No tenderness or deformity. Cardiovascular:    Pulses  Palpation: normal   Ascultation: Regular rate, S1/ S2 normal. No murmur, rub, or gallop. 2+ radial and pedal pulses, symmetric  Carotid  Femoral   Abdomen and Gastrointestinal:   Soft, non-tender, bowel sounds active.   Liver and Spleen  Masses   Musculoskeletal: No muscle wasting  Back  Gait   Extremities: Extremities normal, atraumatic. No cyanosis or edema. No cyanosis clubbing       Skin: Inspection and palpation performed, no rashes or lesions. Pysch: Normal mood and affect. Alert and oriented to time place person   Neurologic: Normal gross motor and sensory exam.       Labs     All labs have been reviewed    Lab Results   Component Value Date    WBC 12.2 2020    RBC 5.14 2020    HGB 12.8 2020    HCT 41.1 2020    MCV 79.9 2020    RDW 16.5 2020     2020     Lab Results   Component Value Date     2020    K 4.1 2020     2020    CO2 32 2020    BUN 34 2020    CREATININE 1.5 2020    GFRAA 56 2020    LABGLOM 46 2020    GLUCOSE 132 2020    CALCIUM 10.0 2020     No results found for: PTINR  No results found for: LABA1C  No results found for: CKTOTAL, CKMB, CKMBINDEX, TROPONINI    Cardiac, Vascular and Imaging Data all Personally Reviewed in Detail by Myself      EK2020 Sinus tachycardia    Echocardiogram: 2020  Overall left ventricular ejection fraction is estimated to be 50-55%. Septal motion is consistent with conduction abnormality. ECHO 2020  Left ventricular cavity size is normal.  There is mild concentric left ventricular hypertrophy. Ejection fraction is visually estimated to be 40--45%. There is mild diffuse hypokinesis. Diastolic filling parameters suggest grade I diastolic dysfunction. Mitral annular calcification is present. Trivial mitral regurgitation is present. No evidence of mitral valve stenosis. Aortic valve leaflets appear calcified. Trivial aortic regurgitation is present. No evidence of aortic valve stenosis. The aortic root is normal in size. The proximal ascending aorta is dilated at 4.1 cm.   IVC size is dilated (>2.1 cm) but collapses > 50% with respiration  consistent with elevated RA pressure (8 mmHg). Unable to estimate pulmonary artery pressure secondary to incomplete TR jet  envelope. Stress Test:     Cath:  Peripheral angiogram 9/16/2020  ANGIOGRAPHY FINDINGS:  1. Abdominal aorta has mild aneurysmal dilation. It has patent renal  and mesenterics. 2.  Left and right common iliac arteries, internal iliac arteries are  patent. 3.  Bilateral external iliac arteries are patent. 4.  Left common femoral, profunda femoris patent. 5.  Left superficial femoral artery is patent. Distally, there is a  left superficial femoral artery aneurysm present. 6.  Left popliteal artery is patent with patent tibioperoneal trunk. The patient has very slow flow in the tibial vessel with distal  occlusion of the dorsalis pedis. 7.  Right common femoral, profunda femoris, and superficial femoral  arteries are patent. Difficult to visualize the right below-the-knee  vessels as tibial flow is very slow.     SUMMARY:  1. 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 2.  Possible embolization from left superficial femoral artery. 3.  Slow flow, rule out any cardiac causes. Other imaging:   BLE arterial doppler 9/11/2020  Right Impression    There is an QASIM of 1.0 on the right. The majority of the waveforms are triphasic throughout the right lower    extremity. Ultrasound images of the right lower extremity reveal calcific plaque    formation throughout. Left Impression    There is an QASIM of 1.0 on the left. The majority of the waveforms are triphasic throughout the left lower    extremity. Ultrasound images of the left lower extremity reveal calcific plaque formation    throughout. Assessment and Plan     Peripheral artery disease/Critical limb ischemia  Discoloration of feet.  L> R. I have discussed the need for peripheral angiogram. I have explained to him the risks and benefits and he is willing to proceed. Continue Asa and statin therapy. Thoracic aortic aneurysm  Following with Dr Aimee Hogan. Got opinion from Dr Bryan Warren and is still contemplating decision. Diabetes  Managed by PCP. 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 Please do not hesitate to contact me if you have any questions.     Ilan Guerrero MD, MPH    [facility], [address] [place] [address]  Ph: [phone]  Fax: [phone]

## 2020-10-15 ENCOUNTER — OFFICE VISIT (OUTPATIENT)
Dept: CARDIOLOGY CLINIC | Age: 75
End: 2020-10-15
Payer: COMMERCIAL

## 2020-10-15 VITALS
HEART RATE: 108 BPM | WEIGHT: 212 LBS | HEIGHT: 72 IN | DIASTOLIC BLOOD PRESSURE: 64 MMHG | TEMPERATURE: 97.5 F | BODY MASS INDEX: 28.71 KG/M2 | OXYGEN SATURATION: 93 % | SYSTOLIC BLOOD PRESSURE: 134 MMHG

## 2020-10-15 PROCEDURE — 99213 OFFICE O/P EST LOW 20 MIN: CPT | Performed by: INTERNAL MEDICINE

## 2020-10-20 ENCOUNTER — TELEPHONE (OUTPATIENT)
Dept: CARDIOLOGY CLINIC | Age: 75
End: 2020-10-20

## 2020-10-20 NOTE — TELEPHONE ENCOUNTER
Peripheral angiogram scheduled for 11/4/2020 at 12:30  Arrive at 11:00      The morning of your procedure you will park in the hospital parking lot and report directly to the cath lab to check in.     Pre-Procedure Instructions   1. You will need to fast for at least 8 hours prior to procedure. No caffeine the morning of.   2. Hold your diuretic, Valsartan/HCTZ and Lasix the morning of procedure. 3. Hold all diabetic medications including, Metfomin. If you take Lantus/Levemir only take ½ your normal dose the evening before. All other medications can be taken in the morning with sips of water. 4. You will need to take 325 mg aspirin the morning of. If you are currently taking 81 mg please take 4 tablets that morning. 5. Do not use any lotions, creams or perfume the morning of procedure. 6. Pre-procedure lab work will need to be completed 5-7 days prior to procedure. 7. Please have a responsible adult to drive you home after procedure. We advise you have someone to stay with you for 24 hours following procedure for precautionary measures. Depending on procedure you may require an overnight stay. 8. Cath lab will provide you with all post procedure instructions. If you have any questions regarding the procedure itself or medications, please call [phone] and ask to speak with a nurse. I have not emailed Cristy Tolentino. Will email once I discuss with Dr Abbey Jang.

## 2020-10-29 ENCOUNTER — HOSPITAL ENCOUNTER (OUTPATIENT)
Age: 75
Discharge: HOME | End: 2020-10-29
Payer: COMMERCIAL

## 2020-10-29 DIAGNOSIS — I25.10 CAD IN NATIVE ARTERY: ICD-10-CM

## 2020-10-29 LAB
ANION GAP SERPL CALCULATED.3IONS-SCNC: 7 MMOL/L (ref 3–16)
BUN BLDV-MCNC: 31 MG/DL (ref 7–20)
CALCIUM SERPL-MCNC: 9.6 MG/DL (ref 8.3–10.6)
CHLORIDE BLD-SCNC: 104 MMOL/L (ref 99–110)
CO2: 36 MMOL/L (ref 21–32)
CREAT SERPL-MCNC: 1.3 MG/DL (ref 0.8–1.3)
GFR AFRICAN AMERICAN: >60
GFR NON-AFRICAN AMERICAN: 54
GLUCOSE BLD-MCNC: 98 MG/DL (ref 70–99)
HCT VFR BLD CALC: 41.8 % (ref 40.5–52.5)
HEMOGLOBIN: 13 G/DL (ref 13.5–17.5)
MCH RBC QN AUTO: 25.3 PG (ref 26–34)
MCHC RBC AUTO-ENTMCNC: 31.1 G/DL (ref 31–36)
MCV RBC AUTO: 81.3 FL (ref 80–100)
PDW BLD-RTO: 17.6 % (ref 12.4–15.4)
PLATELET # BLD: 470 K/UL (ref 135–450)
PMV BLD AUTO: 8.8 FL (ref 5–10.5)
POTASSIUM SERPL-SCNC: 4.2 MMOL/L (ref 3.5–5.1)
RBC # BLD: 5.14 M/UL (ref 4.2–5.9)
SODIUM BLD-SCNC: 147 MMOL/L (ref 136–145)
WBC # BLD: 11.1 K/UL (ref 4–11)

## 2020-10-29 PROCEDURE — 80048 BASIC METABOLIC PNL TOTAL CA: CPT

## 2020-10-29 PROCEDURE — 36415 COLL VENOUS BLD VENIPUNCTURE: CPT

## 2020-10-29 PROCEDURE — 85027 COMPLETE CBC AUTOMATED: CPT

## 2020-10-30 LAB
ESTIMATED AVERAGE GLUCOSE: 177.2 MG/DL
HBA1C MFR BLD: 7.8 %

## 2020-11-04 ENCOUNTER — HOSPITAL ENCOUNTER (OUTPATIENT)
Dept: CARDIAC CATH/INVASIVE PROCEDURES | Age: 75
Discharge: HOME | End: 2020-11-04
Payer: COMMERCIAL

## 2020-11-04 VITALS
RESPIRATION RATE: 28 BRPM | WEIGHT: 212 LBS | DIASTOLIC BLOOD PRESSURE: 75 MMHG | BODY MASS INDEX: 28.71 KG/M2 | HEART RATE: 92 BPM | SYSTOLIC BLOOD PRESSURE: 110 MMHG | OXYGEN SATURATION: 97 % | TEMPERATURE: 97.9 F | HEIGHT: 72 IN

## 2020-11-04 LAB
POC ACT LR: 198 SEC
POC ACT LR: 224 SEC

## 2020-11-04 PROCEDURE — 6360000002 HC RX W HCPCS

## 2020-11-04 PROCEDURE — 85347 COAGULATION TIME ACTIVATED: CPT

## 2020-11-04 PROCEDURE — 6370000000 HC RX 637 (ALT 250 FOR IP)

## 2020-11-04 PROCEDURE — 75716 ARTERY X-RAYS ARMS/LEGS: CPT | Performed by: INTERNAL MEDICINE

## 2020-11-04 PROCEDURE — 37236 OPEN/PERQ PLACE STENT 1ST: CPT | Performed by: INTERNAL MEDICINE

## 2020-11-04 PROCEDURE — C1874 STENT, COATED/COV W/DEL SYS: HCPCS

## 2020-11-04 PROCEDURE — 36245 INS CATH ABD/L-EXT ART 1ST: CPT

## 2020-11-04 PROCEDURE — C1760 CLOSURE DEV, VASC: HCPCS

## 2020-11-04 PROCEDURE — 2709999900 HC NON-CHARGEABLE SUPPLY

## 2020-11-04 PROCEDURE — 75774 ARTERY X-RAY EACH VESSEL: CPT

## 2020-11-04 PROCEDURE — 37226 HC FEM POP TERR PLASTY AND STENT: CPT

## 2020-11-04 PROCEDURE — C1769 GUIDE WIRE: HCPCS

## 2020-11-04 PROCEDURE — 36140 INTRO NDL ICATH UPR/LXTR ART: CPT | Performed by: INTERNAL MEDICINE

## 2020-11-04 PROCEDURE — 99152 MOD SED SAME PHYS/QHP 5/>YRS: CPT

## 2020-11-04 PROCEDURE — C1894 INTRO/SHEATH, NON-LASER: HCPCS

## 2020-11-04 PROCEDURE — C1725 CATH, TRANSLUMIN NON-LASER: HCPCS

## 2020-11-04 PROCEDURE — 6360000004 HC RX CONTRAST MEDICATION: Performed by: INTERNAL MEDICINE

## 2020-11-04 PROCEDURE — 75716 ARTERY X-RAYS ARMS/LEGS: CPT

## 2020-11-04 PROCEDURE — 99153 MOD SED SAME PHYS/QHP EA: CPT

## 2020-11-04 PROCEDURE — 2500000003 HC RX 250 WO HCPCS

## 2020-11-04 RX ORDER — IODIXANOL 320 MG/ML
90 INJECTION, SOLUTION INTRAVASCULAR
Status: COMPLETED | OUTPATIENT
Start: 2020-11-04 | End: 2020-11-04

## 2020-11-04 RX ORDER — SODIUM CHLORIDE 0.9 % (FLUSH) 0.9 %
10 SYRINGE (ML) INJECTION PRN
Status: DISCONTINUED | OUTPATIENT
Start: 2020-11-04 | End: 2020-11-04 | Stop reason: SDUPTHER

## 2020-11-04 RX ORDER — SODIUM CHLORIDE 9 MG/ML
INJECTION, SOLUTION INTRAVENOUS CONTINUOUS
Status: DISCONTINUED | OUTPATIENT
Start: 2020-11-04 | End: 2020-11-04 | Stop reason: SDUPTHER

## 2020-11-04 RX ORDER — ONDANSETRON 2 MG/ML
4 INJECTION INTRAMUSCULAR; INTRAVENOUS EVERY 6 HOURS PRN
Status: DISCONTINUED | OUTPATIENT
Start: 2020-11-04 | End: 2020-11-05 | Stop reason: HOSPADM

## 2020-11-04 RX ORDER — SODIUM CHLORIDE 9 MG/ML
INJECTION, SOLUTION INTRAVENOUS CONTINUOUS
Status: DISCONTINUED | OUTPATIENT
Start: 2020-11-04 | End: 2020-11-05 | Stop reason: HOSPADM

## 2020-11-04 RX ORDER — SODIUM CHLORIDE 0.9 % (FLUSH) 0.9 %
10 SYRINGE (ML) INJECTION EVERY 12 HOURS SCHEDULED
Status: DISCONTINUED | OUTPATIENT
Start: 2020-11-04 | End: 2020-11-04 | Stop reason: SDUPTHER

## 2020-11-04 RX ORDER — ASPIRIN 325 MG
325 TABLET ORAL ONCE
Status: DISCONTINUED | OUTPATIENT
Start: 2020-11-04 | End: 2020-11-05 | Stop reason: HOSPADM

## 2020-11-04 RX ORDER — SODIUM CHLORIDE 0.9 % (FLUSH) 0.9 %
10 SYRINGE (ML) INJECTION PRN
Status: DISCONTINUED | OUTPATIENT
Start: 2020-11-04 | End: 2020-11-05 | Stop reason: HOSPADM

## 2020-11-04 RX ORDER — SODIUM CHLORIDE 0.9 % (FLUSH) 0.9 %
10 SYRINGE (ML) INJECTION EVERY 12 HOURS SCHEDULED
Status: DISCONTINUED | OUTPATIENT
Start: 2020-11-04 | End: 2020-11-05 | Stop reason: HOSPADM

## 2020-11-04 RX ORDER — ACETAMINOPHEN 325 MG/1
650 TABLET ORAL EVERY 4 HOURS PRN
Status: DISCONTINUED | OUTPATIENT
Start: 2020-11-04 | End: 2020-11-05 | Stop reason: HOSPADM

## 2020-11-04 RX ADMIN — IODIXANOL 90 ML: 320 INJECTION, SOLUTION INTRAVASCULAR at 13:23

## 2020-11-04 NOTE — PROCEDURES
[address] [address]                            CARDIAC CATHETERIZATION    PATIENT NAME: Guy Lou                  :        1945  MED REC NO:   [id number]                          ROOM:  ACCOUNT NO:   [de-identified]                           ADMIT DATE: 2020  PROVIDER:     Wiley Olsen MD    DATE OF PROCEDURE:  2020    ASA is II. Mallampati score II. PROCEDURE PERFORMED:  1. Right lower extremity selective angiography, left lower extremity  selective angiography. 2.  Placement of 8 mm x 150-mm stent graft, left superficial femoral  artery, to repair left superficial femoral artery aneurysm. INDICATION FOR PROCEDURE:  Left superficial femoral artery aneurysm. Informed consent obtained. PROCEDURE IN DETAIL:  The patient was brought to cardiac cath  laboratory. Bilateral groins were prepped and draped in sterile  fashion. We accessed the left common femoral artery antegrade fashion,  inserted a 6-[place] Slender sheath. We accessed the right common  femoral artery, inserted a microsheath. Then, we performed left lower  extremity selective angiography. We advanced a V-18 wire crossing the  lesion. Then, we advanced a stent graft, 8 mm x 150 Vi[place]ahn, and  deployed it in the distal left superficial femoral artery covering the  superficial femoral artery aneurysm successfully. Stent was post dilated with an 8-mm balloon. Balloon and catheter removed. Final angiography of left lower extremity  was performed. Then, we performed right lower extremity selective angiography. Then, right catheter was removed. Manual pressure was applied to  achieve hemostasis. Left sheath was removed. Mynx applied to achieve  hemostasis. No complication. Estimated blood loss less than 30 mL. ANGIOGRAPHY FINDINGS:  1.   Patent right common femoral artery, superficial femoral artery on  the right side. 2.  The patient has two-vessel runoff below the knee although slow. 3.  Left common femoral, profunda femoris patent. 4.  Left superficial femoral artery has a 2.5-cm aneurysm. The patient  has two-vessel runoff below the knee. SUMMARY:  Successful repair of left superficial femoral artery aneurysm. RECOMMENDATION:  1. Continue postop post cath care. 2.  Site care. 3.  Risk factor modification. 4.  Aspirin and Plavix will be started. 5.  The patient will follow up with me in the office.         Magaly Gutierrez MD    D: 11/04/2020 13:36:28       T: 11/04/2020 15:08:49     [id number]  Job#: [id number]     Doc#: [id number]    CC:

## 2020-11-04 NOTE — H&P
Reason for Referral: PAD     CC: \"I am ready to have my left leg done. \"        Subjective:      History of Present Illness:     Ochoa Gabriel is a 69 y.o. patient with a PMH significant for hypertension, diabetes and thoracic aortic aneurysm, COPD. Today, he is here to discuss his peripheral artery disease. He is ready to have his leg fixed. He has been following with lung doctor and he was told her cannot be intubated for any procedure. He continuous oxygen therapy. Patient denies exertional chest pain/pressure, dyspnea at rest, KILLIAN, PND, orthopnea, palpitations, lightheadedness, weight changes, changes in LE edema, and syncope. Patient reports compliance to his medications.      Past Medical History:   has a past medical history of COPD (chronic obstructive pulmonary disease) ([facility] [address] Diabetes mellitus ([facility] [address] Hyperlipidemia, and Hypertension.     Surgical History:   has no past surgical history on file.      Social History:   reports that he has quit smoking. He has never used smokeless tobacco. He reports previous alcohol use.      Family History:  family history is not on file.     Home Medications:  Were reviewed and are listed in nursing record and/or below  Home Medications           Prior to Admission medications    Medication Sig Start Date End Date Taking?  Authorizing Provider   potassium chloride (KLOR-CON) 10 MEQ extended release tablet Take 10 mEq by mouth 2 times daily     Yes Historical Provider, MD   albuterol sulfate (PROAIR RESPICLICK) 829 (90 Base) MCG/ACT aerosol powder inhalation Inhale into the lungs every 4 hours as needed for Wheezing or Shortness of Breath     Yes Historical Provider, MD   albuterol (PROVENTIL) (2.5 MG/3ML) 0.083% nebulizer solution Take 2.5 mg by nebulization every 6 hours as needed for Wheezing     Yes Historical Provider, MD   Tiotropium Bromide-Olodaterol (STIOLTO RESPIMAT) 2.5-2.5 MCG/ACT AERS Inhale into the lungs     Yes Historical Provider, MD valsartan-hydrochlorothiazide (DIOVAN-HCT) 160-12.5 MG per tablet Take 1 tablet by mouth daily     Yes Historical Provider, MD   predniSONE (DELTASONE) 10 MG tablet Take 10 mg by mouth daily     Yes Historical Provider, MD   pioglitazone (ACTOS) 30 MG tablet Take 30 mg by mouth daily     Yes Historical Provider, MD   omeprazole (PRILOSEC) 40 MG delayed release capsule Take 40 mg by mouth daily     Yes Historical Provider, MD   Lidocaine 4 % LOTN Apply topically     Yes Historical Provider, MD   levothyroxine (SYNTHROID) 100 MCG tablet Take 100 mcg by mouth Daily     Yes Historical Provider, MD   Pseudoephedrine-guaiFENesin (GUAIFENESIN 600/ PO) Take by mouth     Yes Historical Provider, MD   glimepiride (AMARYL) 2 MG tablet Take 2 mg by mouth every morning (before breakfast)     Yes Historical Provider, MD   furosemide (LASIX) 40 MG tablet Take 40 mg by mouth 2 times daily     Yes Historical Provider, MD   Roflumilast (DALIRESP) 500 MCG tablet Take 500 mcg by mouth daily     Yes Historical Provider, MD   atorvastatin (LIPITOR) 40 MG tablet Take 40 mg by mouth daily     Yes Historical Provider, MD   aspirin 81 MG EC tablet Take 81 mg by mouth daily     Yes Historical Provider, MD   allopurinol (ZYLOPRIM) 100 MG tablet Take 100 mg by mouth daily     Yes Historical Provider, MD   theophylline (UNIPHYL) 400 MG extended release tablet Take 400 mg by mouth daily       Historical Provider, MD            CURRENT Medications:    Current Meds Link used for Sign Out Report   No current facility-administered medications for this visit.         Allergies:  Patient has no known allergies.             Review of Systems: SEE HPI   · Constitutional: no unanticipated weight loss. There's been no change in energy level, sleep pattern, or activity level. No fevers, chills. · Eyes: No visual changes or diplopia. No scleral icterus. · ENT: No Headaches, hearing loss or vertigo.  No mouth sores or sore throat. · Cardiovascular: No Chest pain, tightness or discomfort. · No Shortness of breath. No Dyspnea on exertion, Orthopnea, Paroxysmal nocturnal dyspnea or breathlessness at rest.  · No Palpitations. · No Syncope ('blackouts', 'faints', 'collapse') or dizziness. · Respiratory: No cough or wheezing, no sputum production. No hematemesis. · Gastrointestinal: No abdominal pain, appetite loss, blood in stools. No change in bowel or bladder habits. · Genitourinary: No dysuria, trouble voiding, or hematuria. · Musculoskeletal:  No gait disturbance, no joint complaints. · Integumentary: No rash or pruritis. · Neurological: No headache, diplopia, change in muscle strength, numbness or tingling. · Psychiatric: No anxiety or depression. · Endocrine: No temperature intolerance. No excessive thirst, fluid intake, or urination. No tremor. · Hematologic/Lymphatic: No abnormal bruising or bleeding, blood clots or swollen lymph nodes. · Allergic/Immunologic: No nasal congestion or hives.        Objective:      PHYSICAL EXAM:      There were no vitals filed for this visit.           Vitals:     10/15/20 1354   BP: 134/64   Pulse: 108   Temp: 97.5 °F (36.4 °C)   SpO2: 93%    Weight: 212 lb (96.2 kg)       General Appearance:  Alert, cooperative, no distress, appears stated age. Head:  Normocephalic, without obvious abnormality, atraumatic. Eyes:  Pupils equal and round. No scleral icterus. Mouth: Moist mucosa, no pharyngeal erythema. Nose: Nares normal. No drainage or sinus tenderness. Neck: Supple, symmetrical, trachea midline. No adenopathy. No tenderness/mass/nodules. No carotid bruit or elevated JVD. Lungs:   Respiratory Effort: Normal   Auscultation: Clear to auscultation bilaterally, respirations unlabored. No wheeze, rales   Chest Wall:  No tenderness or deformity. Cardiovascular:     Pulses  Palpation: normal   Ascultation: Regular rate, S1/ S2 normal. No murmur, rub, or gallop.   2+ radial and pedal pulses, symmetric  Carotid  Femoral   Abdomen and Gastrointestinal:   Soft, non-tender, bowel sounds active. Liver and Spleen  Masses   Musculoskeletal: No muscle wasting  Back  Gait   Extremities: Extremities normal, atraumatic. No cyanosis or edema. No cyanosis clubbing         Skin: Inspection and palpation performed, no rashes or lesions. Pysch: Normal mood and affect. Alert and oriented to time place person   Neurologic: Normal gross motor and sensory exam.       Labs      All labs have been reviewed           Lab Results   Component Value Date     WBC 12.2 2020     RBC 5.14 2020     HGB 12.8 2020     HCT 41.1 2020     MCV 79.9 2020     RDW 16.5 2020      2020            Lab Results   Component Value Date      2020     K 4.1 2020      2020     CO2 32 2020     BUN 34 2020     CREATININE 1.5 2020     GFRAA 56 2020     LABGLOM 46 2020     GLUCOSE 132 2020     CALCIUM 10.0 2020      No results found for: PTINR  No results found for: LABA1C  No results found for: CKTOTAL, CKMB, CKMBINDEX, TROPONINI     Cardiac, Vascular and Imaging Data all Personally Reviewed in Detail by Myself       EK2020 Sinus tachycardia     Echocardiogram: 2020  Overall left ventricular ejection fraction is estimated to be 50-55%. Septal motion is consistent with conduction abnormality.        ECHO 2020  Left ventricular cavity size is normal.  There is mild concentric left ventricular hypertrophy. Ejection fraction is visually estimated to be 40--45%. There is mild diffuse hypokinesis. Diastolic filling parameters suggest grade I diastolic dysfunction. Mitral annular calcification is present. Trivial mitral regurgitation is present. No evidence of mitral valve stenosis. Aortic valve leaflets appear calcified. Trivial aortic regurgitation is present.   No evidence of aortic valve stenosis. The aortic root is normal in size. The proximal ascending aorta is dilated at 4.1 cm. IVC size is dilated (>2.1 cm) but collapses > 50% with respiration  consistent with elevated RA pressure (8 mmHg). Unable to estimate pulmonary artery pressure secondary to incomplete TR jet  envelope.     Stress Test:      Cath:  Peripheral angiogram 9/16/2020  ANGIOGRAPHY FINDINGS:  1.  Abdominal aorta has mild aneurysmal dilation.  It has patent renal  and mesenterics. 2.  Left and right common iliac arteries, internal iliac arteries are  patent. 3.  Bilateral external iliac arteries are patent. 4.  Left common femoral, profunda femoris patent. 5.  Left superficial femoral artery is patent.  Distally, there is a  left superficial femoral artery aneurysm present. 6.  Left popliteal artery is patent with patent tibioperoneal trunk. The patient has very slow flow in the tibial vessel with distal  occlusion of the dorsalis pedis. 7.  Right common femoral, profunda femoris, and superficial femoral  arteries are patent.  Difficult to visualize the right below-the-knee  vessels as tibial flow is very slow.     SUMMARY:  1.  Severe plantar arch plantar vessel disease.  Left superficial  femoral artery aneurysm present. 2.  Possible embolization from left superficial femoral artery. 3.  Slow flow, rule out any cardiac causes.     Other imaging:   BLE arterial doppler 9/11/2020  Right Impression    There is an QASIM of 1.0 on the right. The majority of the waveforms are triphasic throughout the right lower    extremity. Ultrasound images of the right lower extremity reveal calcific plaque    formation throughout. Left Impression    There is an QASIM of 1.0 on the left. The majority of the waveforms are triphasic throughout the left lower    extremity.     Ultrasound images of the left lower extremity reveal calcific plaque formation    throughout.          Assessment and Plan      Peripheral artery disease/Critical limb ischemia  Discoloration of feet. L> R. I have discussed the need for peripheral angiogram. I have explained to him the risks and benefits and he is willing to proceed. Continue Asa and statin therapy.      Thoracic aortic aneurysm  Following with Dr Desirae Giles. Got opinion from Dr Anne Marie Segovia and is still contemplating decision.      Diabetes  Managed by PCP.      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   Please do not hesitate to contact me if you have any questions.     Gregg Amor MD, MPH

## 2020-11-06 PROBLEM — N18.30 TYPE 2 DIABETES MELLITUS WITH STAGE 3 CHRONIC KIDNEY DISEASE, WITHOUT LONG-TERM CURRENT USE OF INSULIN (HCC): Status: ACTIVE | Noted: 2020-11-06

## 2020-11-06 PROBLEM — E66.3 OVERWEIGHT (BMI 25.0-29.9): Status: ACTIVE | Noted: 2020-11-01

## 2020-11-06 PROBLEM — E11.22 TYPE 2 DIABETES MELLITUS WITH STAGE 3 CHRONIC KIDNEY DISEASE, WITHOUT LONG-TERM CURRENT USE OF INSULIN (HCC): Status: ACTIVE | Noted: 2020-11-06

## 2020-11-06 PROBLEM — J96.11 CHRONIC RESPIRATORY FAILURE WITH HYPOXIA AND HYPERCAPNIA (HCC): Status: ACTIVE | Noted: 2019-11-04

## 2020-11-06 PROBLEM — Z98.890 S/P ANEURYSM REPAIR: Status: ACTIVE | Noted: 2020-11-04

## 2020-11-06 PROBLEM — R60.0 PEDAL EDEMA: Status: ACTIVE | Noted: 2019-11-04

## 2020-11-06 PROBLEM — I50.32 CHRONIC DIASTOLIC CHF (CONGESTIVE HEART FAILURE), NYHA CLASS 3 (HCC): Status: ACTIVE | Noted: 2020-11-06

## 2020-11-06 PROBLEM — Z99.81 DEPENDENCE ON SUPPLEMENTAL OXYGEN: Status: ACTIVE | Noted: 2020-11-06

## 2020-11-06 PROBLEM — I71.20 THORACIC AORTIC ANEURYSM WITHOUT RUPTURE: Status: ACTIVE | Noted: 2020-11-06

## 2020-11-06 PROBLEM — N18.31 CHRONIC KIDNEY DISEASE, STAGE 3A (HCC): Status: ACTIVE | Noted: 2020-10-06

## 2020-11-06 PROBLEM — J96.12 CHRONIC RESPIRATORY FAILURE WITH HYPOXIA AND HYPERCAPNIA (HCC): Status: ACTIVE | Noted: 2019-11-04

## 2020-11-06 PROBLEM — J42 CHRONIC BRONCHITIS (HCC): Status: ACTIVE | Noted: 2019-12-09

## 2020-11-06 PROBLEM — I27.81 COR PULMONALE, CHRONIC (HCC): Status: ACTIVE | Noted: 2020-11-06

## 2020-11-06 PROBLEM — Z86.79 S/P ANEURYSM REPAIR: Status: ACTIVE | Noted: 2020-11-04

## 2020-11-17 NOTE — PROGRESS NOTES
[facility]  Office Visit    Payton Vilchis  1945    November 19, 2020    CC:   Chief Complaint   Patient presents with    Follow-Up from Hospital     2 week- No CC     HPI:  The patient is 70 y.o. male with a past medical history significant for PAD,  thoracic aortic aneurysm, COPD, HLP and HTN who is here for follow up of peripheral angiogram and intervention. He wa seen by Dr. Soila Myrick in the office on 10/15/2020 and advised to undergo peripheral angiogram d/t discoloration of his feet and L SFA aneurysm . On 11/5/2020 he underwent peripheral angiogram with successful repair of L SFA aneurysm. Overall doing well. BS has been running much better and < 100 in the AM.  + chronic L foot edema. Denies pain or numbness in his legs. + chronic tingling in his feet (neuropathy). Chronic SOB and @ baseline; uses O2 24/7. Denies chest pain/discomfort,orthopnea/PND, cough, palpitations, dizziness, syncope,  weight change. Increased appetite and has noted some weight gain. Uses cane for ambulation. Review of Systems:  Constitutional: Denies  fatigue, night sweats or fever. HEENT: Denies new visual changes, ringing in ears, nosebleeds,nasal congestion  Respiratory: Denies new or change in SOB, PND, orthopnea or cough. Cardiovascular: see HPI  GI: Denies N/V, diarrhea, constipation, abdominal pain, change in bowel habits, melena or hematochezia  : Denies urinary frequency, urgency, incontinence, hematuria or dysuria. Skin: Denies rash, hives, or cyanosis  Musculoskeletal: + chronic low back pain and weakness  Neurological: Denies syncope or TIA-like symptoms.   Psychiatric: Denies anxiety, insomnia or depression     Past Medical History:   Diagnosis Date    COPD (chronic obstructive pulmonary disease) ([facility] [address]     Diabetes mellitus ([facility] [address]     Hyperlipidemia     Hypertension     Overweight (BMI 25.0-29.9) 11/2020    [facility]     S/P aneurysm repair 11/04/2020    L SFA     Past Surgical History:   Procedure Laterality Date    CATARACT REMOVAL WITH IMPLANT Bilateral 2013    ENDOVASCULAR REPAIR PERIPHERAL ANEURYSM Left 11/04/2020    Dr. Rubio November - stent graft to SFA (8 mm x 150 Viabahn); LLE angiogram    NASAL FRACTURE SURGERY      OTHER SURGICAL HISTORY  09/16/2020    Dr. Rubio November - abdominal aortogram w/BLE runoffs     History reviewed. No pertinent family history.   Social History     Tobacco Use    Smoking status: Former Smoker    Smokeless tobacco: Never Used    Tobacco comment: quit 7 years ago   Substance Use Topics    Alcohol use: Not Currently    Drug use: Never       No Known Allergies  Current Outpatient Medications   Medication Sig Dispense Refill    potassium chloride (KLOR-CON) 10 MEQ extended release tablet Take 10 mEq by mouth 2 times daily      albuterol sulfate (PROAIR RESPICLICK) 265 (90 Base) MCG/ACT aerosol powder inhalation Inhale into the lungs every 4 hours as needed for Wheezing or Shortness of Breath      albuterol (PROVENTIL) (2.5 MG/3ML) 0.083% nebulizer solution Take 2.5 mg by nebulization every 6 hours as needed for Wheezing      Tiotropium Bromide-Olodaterol (STIOLTO RESPIMAT) 2.5-2.5 MCG/ACT AERS Inhale into the lungs      valsartan-hydrochlorothiazide (DIOVAN-HCT) 160-12.5 MG per tablet Take 1 tablet by mouth daily      theophylline (UNIPHYL) 400 MG extended release tablet Take 400 mg by mouth daily      predniSONE (DELTASONE) 10 MG tablet Take 10 mg by mouth daily      pioglitazone (ACTOS) 30 MG tablet Take 30 mg by mouth daily      omeprazole (PRILOSEC) 40 MG delayed release capsule Take 40 mg by mouth daily      Lidocaine 4 % LOTN Apply topically      levothyroxine (SYNTHROID) 100 MCG tablet Take 100 mcg by mouth Daily      Pseudoephedrine-guaiFENesin (GUAIFENESIN 600/ PO) Take by mouth      glimepiride (AMARYL) 2 MG tablet Take 2 mg by mouth every morning (before breakfast)      furosemide (LASIX) 40 MG tablet Take 40 mg by mouth 2 times daily      Roflumilast (DALIRESP) 500 MCG tablet Take 500 mcg by mouth daily      atorvastatin (LIPITOR) 40 MG tablet Take 40 mg by mouth daily      aspirin 81 MG EC tablet Take 81 mg by mouth daily      allopurinol (ZYLOPRIM) 100 MG tablet Take 100 mg by mouth daily       No current facility-administered medications for this visit. Physical Exam:   BP (!) 90/56   Pulse 91   Temp 97.3 °F (36.3 °C)   Ht 6' (1.829 m)   Wt 231 lb (104.8 kg)   SpO2 92%   BMI 31.33 kg/m²   Wt Readings from Last 2 Encounters:   11/19/20 231 lb (104.8 kg)   11/04/20 212 lb (96.2 kg)     Constitutional: He is oriented to person, place, and time. He appears well-developed and well-nourished. In no acute distress. HEENT: Normocephalic and atraumatic. Sclerae anicteric. No xanthelasmas. EOM's intact. Neck: Supple. No JVD present. Carotids without bruits. No thyromegaly present. Cardiovascular: RRR, normal S1 and S2; no murmur/gallop or rub  Pulmonary/Chest: Effort normal.  Lungs with diminished breath sounds bilaterally. Chest wall nontender  Abdominal: soft, nontender, nondistended. + bowel sounds; no hepatomegaly  Extremities: No edema, cyanosis, or clubbing. Pulses are 2+ radial/DP/PT bilaterally. Cap refill brisk. Neurological: No focal deficit. Skin: Skin is warm and dry. Psychiatric: He has a normal mood and affect. His speech is normal and behavior is normal.     Lab Review:   No results found for: TRIG, HDL, LDLCALC, LDLDIRECT, LABVLDL  Lab Results   Component Value Date     10/29/2020    K 4.2 10/29/2020     10/29/2020    CO2 36 10/29/2020    BUN 31 10/29/2020    CREATININE 1.3 10/29/2020    GLUCOSE 98 10/29/2020    CALCIUM 9.6 10/29/2020      Lab Results   Component Value Date    WBC 11.1 (H) 10/29/2020    HGB 13.0 (L) 10/29/2020    HCT 41.8 10/29/2020    MCV 81.3 10/29/2020     (H) 10/29/2020     11/4/2020 Peripheral angiogram:  1.   Patent right common femoral artery, superficial femoral artery on  the right side. 2.  The patient has two-vessel runoff below the knee although slow. 3.  Left common femoral, profunda femoris patent. 4.  Left superficial femoral artery has a 2.5-cm aneurysm. The patient  has two-vessel runoff below the knee.     SUMMARY:  Successful repair of left superficial femoral artery aneurysm.     9/16/2020 Peripheral angiogram:  ANGIOGRAPHY FINDINGS:  1. Abdominal aorta has mild aneurysmal dilation. It has patent renal  and mesenterics. 2.  Left and right common iliac arteries, internal iliac arteries are  patent. 3.  Bilateral external iliac arteries are patent. 4.  Left common femoral, profunda femoris patent. 5.  Left superficial femoral artery is patent. Distally, there is a  left superficial femoral artery aneurysm present. 6.  Left popliteal artery is patent with patent tibioperoneal trunk. The patient has very slow flow in the tibial vessel with distal  occlusion of the dorsalis pedis. 7.  Right common femoral, profunda femoris, and superficial femoral  arteries are patent. Difficult to visualize the right below-the-knee  vessels as tibial flow is very slow.     SUMMARY:  1. 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 2.  Possible embolization from left superficial femoral artery. 3.  Slow flow, rule out any cardiac causes. 9/11/2020 LE dopplers:  Right Impression    There is an QASIM of 1.0 on the right. The majority of the waveforms are triphasic throughout the right lower    extremity. Ultrasound images of the right lower extremity reveal calcific plaque    formation throughout. Left Impression    There is an QASIM of 1.0 on the left. The majority of the waveforms are triphasic throughout the left lower    extremity. Ultrasound images of the left lower extremity reveal calcific plaque formation    throughout.           Cardiac testing:    10/16/2020 Lexiscan-Myoview:   Negative ECG for ischemia with pharmocologic stress.     o No significant areas of ischemia identified with pharmocologic stress.     o Nuclear stress image findings indicate low risk for future ischemic       event . Echo 9/16/2020:  Left ventricular cavity size is normal.   There is mild concentric left ventricular hypertrophy. Ejection fraction is visually estimated to be 40--45%. There is mild diffuse hypokinesis. Diastolic filling parameters suggest grade I diastolic dysfunction. Mitral annular calcification is present. Trivial mitral regurgitation is present. No evidence of mitral valve stenosis. Aortic valve leaflets appear calcified. Trivial aortic regurgitation is present. No evidence of aortic valve stenosis. The aortic root is normal in size. The proximal ascending aorta is dilated at 4.1 cm. IVC size is dilated (>2.1 cm) but collapses > 50% with respiration   consistent with elevated RA pressure (8 mmHg). Unable to estimate pulmonary artery pressure secondary to incomplete TR jet   envelope. Echo 1/16/2020:  Overall left ventricular ejection fraction is estimated to be 50-55%. Septal motion is consistent with conduction abnormality. Assessment:    1. PAD (peripheral artery disease) (HCC)/Critical lower limb ischemia  -S/P L SFA aneurysm repair  -minimal claudication symptoms  -continue ASA and statin    2. Thoracic aortic aneurysm without rupture (HCC)  -6.5 cms  (follows with Dr. Glenis Hamman)  -has been seen by Dr. Marin Hallmark: no surgical approach    3. Essential HTN  -well controlled  -continue medical management    Plan:  Continue ASA, valsartan/HCTZ, lasix, statin  Discussed low fat/low sodium diet and reinforced regular aerobic exercise. Check LE dopplers prior to return visit in ~ 4 months  Follow up with Dr. Niesha Odell in 5-6 months or sooner if needed    Return in about 6 months (around 5/19/2021) for 5-6 months with Dr. Niesha Odell. Thanks for allowing me to participate in the care of this patient.       TODD Munoz  [facility], [address] [place] [address] [address] [place] [address], [address] [place] [address]  Office: [phone]  Fax: [phone]      Electronically signed by ALEJANDRA Talamantes CNP on 11/19/2020 at 3:03 PM

## 2020-11-19 ENCOUNTER — OFFICE VISIT (OUTPATIENT)
Dept: CARDIOLOGY CLINIC | Age: 75
End: 2020-11-19
Payer: COMMERCIAL

## 2020-11-19 VITALS
SYSTOLIC BLOOD PRESSURE: 90 MMHG | OXYGEN SATURATION: 92 % | WEIGHT: 231 LBS | TEMPERATURE: 97.3 F | DIASTOLIC BLOOD PRESSURE: 56 MMHG | HEIGHT: 72 IN | HEART RATE: 91 BPM | BODY MASS INDEX: 31.29 KG/M2

## 2020-11-19 PROCEDURE — 99213 OFFICE O/P EST LOW 20 MIN: CPT | Performed by: NURSE PRACTITIONER

## 2020-11-19 NOTE — PATIENT INSTRUCTIONS
Continue same medications    Please call in February 2021 and arrange for doppler test on lower extremities, prior to return with Dr. Tracey Berg    Follow up with podiatry

## 2023-12-28 NOTE — TELEPHONE ENCOUNTER
Called patient and let him know that Dr Ashok Johnson is aware of the nephrology need. He said that he is waiting on call from nephrology office to get scheduled. He will call back if he has not heard anything by Friday.
Cynthia Curry called in this morning stating that he had a peripheral vascular angiogram and he was supposed to have something done to follow up. Per Dr. Amy Cobian note, I see he might need another doppler done. I will call to schedule this for the patient but there is no order. Please place order and I'll facilitate scheduling. You can reach Cynthia Curry at [phone] with any questions.
Dr Radha Gunderson spoke with the patient and patient states that he is putting everything on hold until he gets a second opinion.
I spoke with Tommy Coe and he said that he is putting everything on hold right now because he wants a second opinion. Would you like to call and speak to him?
Nicky Carlson called back this morning returning Yvonne Buckner, [facility] call.     You can reach Nicky Carlson at #[phone]
Order placed. Please call patient to schedule.
Pt saw Dr Venecia Whitaker on 9/21 and called today stating that the doctor wants him to see a kidney doctor before doing any procedure.     You can reach the pt at [phone]
Received a call that patient needs to be seen by Dr Taiwo Petersen today or tomorrow. Referral placed. Called office to make appointment for tomorrow as patient says he is unable to go today. Called the office and made them aware of consult. She will reach out to the patient to get him scheduled.
Received call back from Dr Byron Mckeon office and patient is scheduled to be seen on 9/21 at 4:00. Called the patient and made him aware of the appointment. Address and phone number provided for Dr Byron Mckeon office. I also let him know that I will be calling him next week after he has this appointment to get procedure scheduled. He verbalized understanding.
Received message from Dr Radha Gunderson that patient needs to be scheduled to see Dr Aiden Dave next week. I have called and left a message with the office to get this scheduled. He also needs scheduled for repair of femoral artery aneurysm.
Rosi Fiore Freed a message to return call to discuss the nephrology consult.
437